# Patient Record
(demographics unavailable — no encounter records)

---

## 2025-04-15 NOTE — HEALTH RISK ASSESSMENT
[Good] : ~his/her~ current health as good [No] : No [Never (0 pts)] : Never (0 points) [No falls in past year] : Patient reported no falls in the past year [0] : 2) Feeling down, depressed, or hopeless: Not at all (0) [PHQ-2 Negative - No further assessment needed] : PHQ-2 Negative - No further assessment needed [[id number]] : 0

## 2025-04-15 NOTE — REVIEW OF SYSTEMS
[Recent Change In Weight] : ~T no recent weight change [Dizziness] : dizziness [Negative] : Musculoskeletal [de-identified] : when head back

## 2025-04-15 NOTE — HISTORY OF PRESENT ILLNESS
[FreeTextEntry1] : Annual [de-identified] : Annual flu shot recent colon  deaf sign  present during exam  in [place] fatigue recent took week off for fatigue eats erratically from 1-3 meals daily noticed room spinning if put head back no syncope some abdominal bloat

## 2025-04-15 NOTE — PLAN
[FreeTextEntry1] : Annual Plus problems flu vaccine recent colon  EKG WNL room spin when head down Epp[place] maneuver  no findings on exam fatigue most likely eating habits 2 - 3 well rounded meals daily full blood evaluation check vit, cbc and thyroid  and other liver and renal

## 2025-04-15 NOTE — HEALTH RISK ASSESSMENT
[Good] : ~his/her~ current health as good [No] : No [Never (0 pts)] : Never (0 points) [No falls in past year] : Patient reported no falls in the past year [0] : 2) Feeling down, depressed, or hopeless: Not at all (0) [PHQ-2 Negative - No further assessment needed] : PHQ-2 Negative - No further assessment needed [[id number]] : 0

## 2025-04-15 NOTE — HISTORY OF PRESENT ILLNESS
[FreeTextEntry1] : Annual [de-identified] : Annual flu shot recent colon  deaf sign  present during exam  in [place] fatigue recent took week off for fatigue eats erratically from 1-3 meals daily noticed room spinning if put head back no syncope some abdominal bloat

## 2025-04-15 NOTE — REVIEW OF SYSTEMS
[Recent Change In Weight] : ~T no recent weight change [Dizziness] : dizziness [Negative] : Musculoskeletal [de-identified] : when head back